# Patient Record
Sex: MALE | Race: WHITE | URBAN - METROPOLITAN AREA
[De-identification: names, ages, dates, MRNs, and addresses within clinical notes are randomized per-mention and may not be internally consistent; named-entity substitution may affect disease eponyms.]

---

## 2024-07-24 ENCOUNTER — TELEPHONE (OUTPATIENT)
Dept: PEDIATRIC ENDOCRINOLOGY | Facility: CLINIC | Age: 89
End: 2024-07-24

## 2024-07-24 NOTE — TELEPHONE ENCOUNTER
Tj wanted his 's form signed.    He started Omnipod 5.  Reviewed download and Tj is having frequent hypoglycemia    Spoke with Tj yesterday and made changes to pump   Carb ratio 1:8g to 1:9g  Target changed to from 130 to 140 all day              Tj states he is having less hypoglycemia.  He has an appointment 8/8/24 for additional adjustments.  Tj to call before appointment if he is having 2 or more lows per day.  BMV signed for 6 months.